# Patient Record
Sex: MALE | Race: WHITE | NOT HISPANIC OR LATINO | URBAN - METROPOLITAN AREA
[De-identification: names, ages, dates, MRNs, and addresses within clinical notes are randomized per-mention and may not be internally consistent; named-entity substitution may affect disease eponyms.]

---

## 2024-06-10 PROBLEM — K21.9 GERD WITHOUT ESOPHAGITIS: Status: ACTIVE | Noted: 2024-06-10

## 2024-06-10 PROBLEM — R10.84 GENERALIZED ABDOMINAL PAIN: Status: ACTIVE | Noted: 2024-06-10

## 2024-06-10 PROBLEM — J02.9 SORE THROAT: Status: ACTIVE | Noted: 2024-06-10

## 2024-06-10 PROBLEM — M54.50 LOW BACK PAIN: Status: ACTIVE | Noted: 2024-06-10

## 2024-06-10 PROBLEM — K59.09 CHRONIC CONSTIPATION: Status: ACTIVE | Noted: 2024-06-10

## 2024-06-10 PROBLEM — S05.01XA: Status: ACTIVE | Noted: 2024-06-10

## 2024-06-11 ENCOUNTER — APPOINTMENT (OUTPATIENT)
Dept: GASTROENTEROLOGY | Facility: CLINIC | Age: 69
End: 2024-06-11
Payer: MEDICARE

## 2024-06-11 ENCOUNTER — APPOINTMENT (OUTPATIENT)
Dept: GASTROENTEROLOGY | Facility: CLINIC | Age: 69
End: 2024-06-11

## 2024-06-11 VITALS
DIASTOLIC BLOOD PRESSURE: 70 MMHG | RESPIRATION RATE: 16 BRPM | SYSTOLIC BLOOD PRESSURE: 116 MMHG | HEART RATE: 69 BPM | OXYGEN SATURATION: 93 % | HEIGHT: 72 IN | BODY MASS INDEX: 26.55 KG/M2 | WEIGHT: 196 LBS

## 2024-06-11 DIAGNOSIS — K21.9 GERD WITHOUT ESOPHAGITIS: ICD-10-CM

## 2024-06-11 DIAGNOSIS — K59.09 CHRONIC CONSTIPATION: ICD-10-CM

## 2024-06-11 DIAGNOSIS — Z86.010 HISTORY OF COLON POLYPS: Primary | ICD-10-CM

## 2024-06-11 PROBLEM — K64.9 HEMORRHOID: Status: ACTIVE | Noted: 2023-06-05

## 2024-06-11 PROBLEM — F32.5 MAJOR DEPRESSION, SINGLE EPISODE, IN COMPLETE REMISSION (CMS-HCC): Status: ACTIVE | Noted: 2023-05-11

## 2024-06-11 PROBLEM — M50.90 CERVICAL DISC DISEASE: Status: ACTIVE | Noted: 2024-06-11

## 2024-06-11 PROBLEM — M47.812 SPONDYLOSIS WITHOUT MYELOPATHY OR RADICULOPATHY, CERVICAL REGION: Status: ACTIVE | Noted: 2023-05-11

## 2024-06-11 PROBLEM — R73.01 IFG (IMPAIRED FASTING GLUCOSE): Status: ACTIVE | Noted: 2023-05-11

## 2024-06-11 PROBLEM — G57.72 COMPLEX REGIONAL PAIN SYNDROME TYPE 2 OF LEFT LOWER EXTREMITY: Status: ACTIVE | Noted: 2023-05-11

## 2024-06-11 PROBLEM — F41.1 GENERALIZED ANXIETY DISORDER: Status: ACTIVE | Noted: 2023-05-11

## 2024-06-11 PROBLEM — C73 THYROID CANCER (MULTI): Status: ACTIVE | Noted: 2023-05-11

## 2024-06-11 PROBLEM — E04.9 GOITER: Status: ACTIVE | Noted: 2024-06-11

## 2024-06-11 PROBLEM — D72.819 LEUKOPENIA: Status: ACTIVE | Noted: 2024-06-03

## 2024-06-11 PROBLEM — N32.81 DETRUSOR OVERACTIVITY: Status: ACTIVE | Noted: 2024-06-11

## 2024-06-11 PROBLEM — F41.9 ANXIETY: Status: ACTIVE | Noted: 2024-06-11

## 2024-06-11 PROBLEM — G90.50 COMPLEX REGIONAL PAIN SYNDROME I: Status: ACTIVE | Noted: 2024-06-11

## 2024-06-11 PROBLEM — E78.2 MIXED HYPERLIPIDEMIA: Status: ACTIVE | Noted: 2023-05-11

## 2024-06-11 PROBLEM — M15.9 GENERALIZED OSTEOARTHRITIS: Status: ACTIVE | Noted: 2024-06-11

## 2024-06-11 PROBLEM — K44.9 HIATAL HERNIA: Status: ACTIVE | Noted: 2023-05-11

## 2024-06-11 PROBLEM — E55.9 VITAMIN D DEFICIENCY: Status: ACTIVE | Noted: 2023-05-11

## 2024-06-11 PROBLEM — N40.0 BENIGN PROSTATIC HYPERPLASIA WITHOUT URINARY OBSTRUCTION: Status: ACTIVE | Noted: 2023-05-11

## 2024-06-11 PROBLEM — H91.90 HOH (HARD OF HEARING): Status: ACTIVE | Noted: 2024-06-11

## 2024-06-11 PROCEDURE — 1159F MED LIST DOCD IN RCRD: CPT | Performed by: NURSE PRACTITIONER

## 2024-06-11 PROCEDURE — 99213 OFFICE O/P EST LOW 20 MIN: CPT | Performed by: NURSE PRACTITIONER

## 2024-06-11 PROCEDURE — 1160F RVW MEDS BY RX/DR IN RCRD: CPT | Performed by: NURSE PRACTITIONER

## 2024-06-11 PROCEDURE — 1036F TOBACCO NON-USER: CPT | Performed by: NURSE PRACTITIONER

## 2024-06-11 RX ORDER — MELATONIN 10 MG/ML
DROPS ORAL
COMMUNITY

## 2024-06-11 RX ORDER — FINASTERIDE 5 MG/1
TABLET, FILM COATED ORAL
COMMUNITY

## 2024-06-11 RX ORDER — BUPROPION HYDROCHLORIDE 150 MG/1
150 TABLET, EXTENDED RELEASE ORAL 2 TIMES DAILY
COMMUNITY

## 2024-06-11 RX ORDER — LEVOTHYROXINE SODIUM 112 UG/1
112 TABLET ORAL
COMMUNITY

## 2024-06-11 RX ORDER — OMEPRAZOLE 40 MG/1
1 CAPSULE, DELAYED RELEASE ORAL
COMMUNITY
Start: 2021-03-19

## 2024-06-11 RX ORDER — DICYCLOMINE HYDROCHLORIDE 20 MG/1
TABLET ORAL
COMMUNITY
Start: 2020-10-16

## 2024-06-11 RX ORDER — TAMSULOSIN HYDROCHLORIDE 0.4 MG/1
CAPSULE ORAL
COMMUNITY

## 2024-06-11 RX ORDER — MIRABEGRON 25 MG/1
TABLET, FILM COATED, EXTENDED RELEASE ORAL
COMMUNITY

## 2024-06-11 RX ORDER — MELOXICAM 15 MG/1
TABLET ORAL
COMMUNITY

## 2024-06-11 RX ORDER — CYCLOBENZAPRINE HCL 5 MG
TABLET ORAL
COMMUNITY
Start: 2023-08-21

## 2024-06-11 RX ORDER — LYSINE HCL 500 MG
2000 TABLET ORAL
COMMUNITY

## 2024-06-11 RX ORDER — FLUOXETINE HYDROCHLORIDE 20 MG/1
20 CAPSULE ORAL
COMMUNITY

## 2024-06-11 RX ORDER — POLYETHYLENE GLYCOL 3350 17 G/17G
POWDER, FOR SOLUTION ORAL
COMMUNITY

## 2024-06-11 RX ORDER — CLONAZEPAM 1 MG/1
TABLET ORAL
COMMUNITY

## 2024-06-11 NOTE — PROGRESS NOTES
Subjective   Patient ID: Luciano Silva is a 69 y.o. male who presents for Constipation (Takes Miralax, fiber, and dicyclomine, states that this helps with constipation. He also states that he is overdue for colonoscopy, last one in 2019.).  \[facility] [place] [facility]\"" 7/19/2023 with Mary Cordova NP:  1. Chronic GERD - EGD - 7/22 - normal esophagus, 1cm HH  2. History of chronic NSAID - takes meloxicam daily   3. Chronic constipation - IBS C - uses Miralax and fiber - colonoscopy - 8/19 - normal, no specimens collected. Repeat in 5 years.      Plan:  1. Continue omeprazole 40 mg once a day -sent - #90, RF#3 to the patient's pharmacy  2. Chronic constipation -use MiraLAX 17 g 1-2 times a day and continue fiber supplements daily. Increase water intake to 40 to 60 ounces of water daily  3. Advised to take dicyclomine 20 mg as needed. Currently taking it 3 times a day which may be too much and may be increasing constipation. # 270, RF#3 to the patient's pharmacy  4. Patient will be due for a surveillance colonoscopy in August 2024  5. Follow-up in the GI clinic in July 2024     ->Colonoscopy 2019:   No specimens collected.  Recommendation:        - Discharge patient to home (ambulatory).                         - Resume previous diet.                         - Continue present medications.                         - Repeat colonoscopy in 5 years for surveillance.    Patient here to schedule surveillance colonoscopy.  He reports history of colon polyps.  Having daily bowel movements with MiraLAX once daily.  He denies constipation or diarrhea.  He uses dicyclomine as needed to help with abdominal discomfort.  No complaints of melena or hematochezia.  Taking omeprazole daily with good response to his GERD symptoms.  Continues to use meloxicam as needed.  No nausea or vomiting.  No dysphagia or odynophagia.  Weight and appetite are stable.  Former smoker.  No alcohol use.    Review of Systems   All other systems reviewed and are  negative.      Objective   Physical Exam  Vitals reviewed.   Constitutional:       General: He is awake. He is not in acute distress.     Appearance: Normal appearance. He is well-developed and normal weight. He is not ill-appearing, toxic-appearing or diaphoretic.   HENT:      Head: Normocephalic and atraumatic.   Eyes:      Pupils: Pupils are equal, round, and reactive to light.   Cardiovascular:      Rate and Rhythm: Normal rate and regular rhythm.      Heart sounds: Normal heart sounds. No murmur heard.  Pulmonary:      Effort: Pulmonary effort is normal.   Abdominal:      General: Bowel sounds are normal.      Palpations: Abdomen is soft. There is no hepatomegaly.      Tenderness: There is no abdominal tenderness.   Musculoskeletal:         General: Normal range of motion.      Cervical back: Normal range of motion.      Right lower leg: No edema.      Left lower leg: No edema.   Skin:     General: Skin is warm and dry.      Coloration: Skin is not jaundiced or pale.   Neurological:      General: No focal deficit present.      Mental Status: He is alert and oriented to person, place, and time.      Motor: No weakness.      Gait: Gait normal.   Psychiatric:         Mood and Affect: Mood normal.         Behavior: Behavior normal. Behavior is cooperative.         Thought Content: Thought content normal.         Judgment: Judgment normal.         Assessment/Plan   Diagnoses and all orders for this visit:  History of colon polyps  -     Colonoscopy Screening; High Risk Patient; history of colon polyps; the procedure was discussed at length, including all possible risks.  Importance of the bowel prep was stressed to the patient for optimal results.  Chronic constipation  Good response to MiraLAX.  Recommend he continue MiraLAX and titrate as needed.  -Adequate fluids throughout the day  -Walk 30 minutes daily to help promote GI motility  -Footstool or squatty potty during bowel movements  -Increase dietary  fiber  -Limit dicyclomine use, side effects reviewed.  GERD without esophagitis  -Antireflux lifestyle recommended  -Daily PPI 15 to 30 minutes before breakfast.  -Follow-up in 1 year, sooner if needed.         MARIAN Deleon-CNP 06/11/24 2:12 PM

## 2024-06-26 ENCOUNTER — ANESTHESIA EVENT (OUTPATIENT)
Dept: GASTROENTEROLOGY | Facility: EXTERNAL LOCATION | Age: 69
End: 2024-06-26

## 2024-06-26 DIAGNOSIS — K58.1 IRRITABLE BOWEL SYNDROME WITH CONSTIPATION: Primary | ICD-10-CM

## 2024-06-26 RX ORDER — DICYCLOMINE HYDROCHLORIDE 20 MG/1
10 TABLET ORAL 4 TIMES DAILY PRN
Qty: 60 TABLET | Refills: 0 | Status: SHIPPED | OUTPATIENT
Start: 2024-06-26 | End: 2024-07-26

## 2024-07-11 ENCOUNTER — APPOINTMENT (OUTPATIENT)
Dept: GASTROENTEROLOGY | Facility: EXTERNAL LOCATION | Age: 69
End: 2024-07-11
Payer: COMMERCIAL

## 2024-07-11 ENCOUNTER — ANESTHESIA (OUTPATIENT)
Dept: GASTROENTEROLOGY | Facility: EXTERNAL LOCATION | Age: 69
End: 2024-07-11

## 2024-07-11 VITALS
HEIGHT: 72 IN | TEMPERATURE: 97.9 F | WEIGHT: 195 LBS | HEART RATE: 65 BPM | BODY MASS INDEX: 26.41 KG/M2 | DIASTOLIC BLOOD PRESSURE: 74 MMHG | RESPIRATION RATE: 10 BRPM | SYSTOLIC BLOOD PRESSURE: 125 MMHG | OXYGEN SATURATION: 99 %

## 2024-07-11 DIAGNOSIS — Z86.010 HISTORY OF COLON POLYPS: Primary | ICD-10-CM

## 2024-07-11 PROCEDURE — G0105 COLORECTAL SCRN; HI RISK IND: HCPCS | Performed by: INTERNAL MEDICINE

## 2024-07-11 RX ORDER — SODIUM CHLORIDE 9 MG/ML
20 INJECTION, SOLUTION INTRAVENOUS CONTINUOUS
Status: CANCELLED | OUTPATIENT
Start: 2024-07-11

## 2024-07-11 RX ORDER — SODIUM CHLORIDE 9 MG/ML
INJECTION, SOLUTION INTRAVENOUS CONTINUOUS PRN
Status: DISCONTINUED | OUTPATIENT
Start: 2024-07-11 | End: 2024-07-11

## 2024-07-11 RX ORDER — LIDOCAINE HYDROCHLORIDE 20 MG/ML
INJECTION, SOLUTION INFILTRATION; PERINEURAL AS NEEDED
Status: DISCONTINUED | OUTPATIENT
Start: 2024-07-11 | End: 2024-07-11

## 2024-07-11 RX ORDER — PROPOFOL 10 MG/ML
INJECTION, EMULSION INTRAVENOUS AS NEEDED
Status: DISCONTINUED | OUTPATIENT
Start: 2024-07-11 | End: 2024-07-11

## 2024-07-11 SDOH — HEALTH STABILITY: MENTAL HEALTH: CURRENT SMOKER: 0

## 2024-07-11 ASSESSMENT — PAIN SCALES - GENERAL
PAINLEVEL_OUTOF10: 0 - NO PAIN
PAIN_LEVEL: 0
PAINLEVEL_OUTOF10: 0 - NO PAIN

## 2024-07-11 ASSESSMENT — COLUMBIA-SUICIDE SEVERITY RATING SCALE - C-SSRS
2. HAVE YOU ACTUALLY HAD ANY THOUGHTS OF KILLING YOURSELF?: NO
1. IN THE PAST MONTH, HAVE YOU WISHED YOU WERE DEAD OR WISHED YOU COULD GO TO SLEEP AND NOT WAKE UP?: NO
6. HAVE YOU EVER DONE ANYTHING, STARTED TO DO ANYTHING, OR PREPARED TO DO ANYTHING TO END YOUR LIFE?: NO

## 2024-07-11 ASSESSMENT — PAIN - FUNCTIONAL ASSESSMENT
PAIN_FUNCTIONAL_ASSESSMENT: 0-10

## 2024-07-11 NOTE — ANESTHESIA POSTPROCEDURE EVALUATION
Patient: Luciano Silva    Procedure Summary       Date: 07/11/24 Room / Location: [facility]    Anesthesia Start: 1328 Anesthesia Stop: 1352    Procedure: COLONOSCOPY Diagnosis: History of colon polyps    Scheduled Providers: Richard Fernandez MD Responsible Provider: CECILE Bustamante    Anesthesia Type: MAC ASA Status: 2            Anesthesia Type: MAC    Vitals Value Taken Time   /88 07/11/24 1359   Temp 36.6 07/11/24 1359   Pulse 66 07/11/24 1359   Resp 12` 07/11/24 1359   SpO2 97 07/11/24 1359       Anesthesia Post Evaluation    Patient location during evaluation: bedside  Patient participation: complete - patient participated  Level of consciousness: awake  Pain score: 0  Pain management: adequate  Airway patency: patent  Cardiovascular status: acceptable  Respiratory status: acceptable  Hydration status: acceptable  Postoperative Nausea and Vomiting: none      No notable events documented.

## 2024-07-11 NOTE — ANESTHESIA PREPROCEDURE EVALUATION
Patient: Luciano Silva    Procedure Information       Date/Time: 07/11/24 1230    Scheduled providers: Richard Fernandez MD    Procedure: COLONOSCOPY    Location: [facility]            Relevant Problems   Cardiac   (+) Mixed hyperlipidemia      Neuro   (+) Anxiety   (+) Complex regional pain syndrome of lower extremity   (+) Complex regional pain syndrome type 2 of left lower extremity   (+) Dysthymia   (+) Generalized anxiety disorder   (+) Major depression, single episode, in complete remission (CMS-HCC)      GI   (+) GERD without esophagitis   (+) Gastroesophageal reflux disease with esophagitis   (+) Hiatal hernia      /Renal   (+) Benign prostatic hyperplasia without urinary obstruction      Endocrine   (+) Goiter   (+) Thyroid cancer (Multi)      Musculoskeletal   (+) Complex regional pain syndrome I   (+) Complex regional pain syndrome of lower extremity   (+) Complex regional pain syndrome type 2 of left lower extremity   (+) Generalized osteoarthritis   (+) Osteoarthritis   (+) Spondylosis without myelopathy or radiculopathy, cervical region      HEENT   (+) [place] (hard of hearing)       Clinical information reviewed:   Tobacco  Allergies  Meds   Med Hx  Surg Hx   Fam Hx  Soc Hx        NPO Detail:  NPO/Void Status  Carbohydrate Drink Given Prior to Surgery? : N  Date of Last Liquid: 07/11/24  Time of Last Liquid: 0830  Date of Last Solid: 07/09/24  Time of Last Solid: 1800  Last Intake Type: Clear fluids  Time of Last Void: 1241         Physical Exam    Airway  Mallampati: II  TM distance: >3 FB  Neck ROM: full     Cardiovascular - normal exam  Rhythm: regular  Rate: normal     Dental - normal exam  (+) upper dentures, lower dentures     Pulmonary - normal exam  Breath sounds clear to auscultation     Abdominal - normal exam         Anesthesia Plan    History of general anesthesia?: yes  History of complications of general anesthesia?: no    ASA 2     MAC     The patient is not a  current smoker.    intravenous induction   Anesthetic plan and risks discussed with patient.    Plan discussed with CRNA.   Thalidomide Counseling: I discussed with the patient the risks of thalidomide including but not limited to birth defects, anxiety, weakness, chest pain, dizziness, cough and severe allergy.

## 2024-07-11 NOTE — H&P
Outpatient NR Procedure H&P    Patient Profile  Name Luciano Silva  Date of Birth 1955  MRN [id number]  PCP [facility]        Diagnosis: screening colonoscopy  Procedure(s): Colonoscopy       Allergies  Allergies   Allergen Reactions    Fesoterodine Itching and Other     mentally confused     Other Reaction(s): Other: See Comments      mentally confused    mentally confused    Gabapentin Confusion     Other Reaction(s): Confusion    Maprotiline Confusion     Other Reaction(s): Confusion    Tramadol Hives and Unknown    Amoxicillin-Pot Clavulanate Unknown, Rash and Other     Other Reaction(s): Upset Stomach      Other Reaction(s): Intolerance       Past Medical History   Past Medical History:   Diagnosis Date    Anxiety     Colon polyp     Depression     GERD (gastroesophageal reflux disease)     History of BPH     Personal history of other endocrine, nutritional and metabolic disease     History of hypothyroidism       Medication Reviewed - yes  Prior to Admission medications    Medication Sig Start Date End Date Taking? Authorizing Provider   buPROPion SR (Wellbutrin SR) 150 mg 12 hr tablet Take 1 tablet (150 mg) by mouth 2 times a day.   Yes Historical Provider, MD   calcium carbonate-vit D3-min 600 mg calcium- 400 unit tablet Take 2,000 Units by mouth.   Yes Historical Provider, MD   cholecalciferol, vitamin D3, 50 mcg (2,000 unit) tablet,chewable Chew.   Yes Historical Provider, MD   clonazePAM (KlonoPIN) 1 mg tablet TAKE 1 TABLET (1 MG) BY MOUTH DAILY AT BEDTIME AS NEEDED FOR ANXIETY   Yes Historical Provider, MD   cyclobenzaprine (Flexeril) 5 mg tablet TAKE 1/2 TO 1 (ONE-HALF TO ONE) TABLET BY MOUTH EVERY 8 HOURS AS NEEDED FOR MUSCLE TIGHTNESS 8/21/23  Yes Historical Provider, MD   dicyclomine (Bentyl) 20 mg tablet Take 0.5 tablets (10 mg) by mouth 4 times a day as needed (abdominal pain). 6/26/24 7/26/24 Yes MARIAN Deleon-CNP   finasteride (Proscar) 5 mg tablet 1 tablet Orally  Once a day   Yes Historical Provider, MD   FLUoxetine (PROzac) 20 mg capsule Take 1 capsule (20 mg) by mouth once daily.   Yes Historical Provider, MD   levothyroxine (Synthroid, Levoxyl) 112 mcg tablet Take 1 tablet (112 mcg) by mouth once daily in the morning. Take before meals. Take on an empty stomach.   Yes Historical Provider, MD   meloxicam (Mobic) 15 mg tablet TAKE 1/2 TO 1 TABLET EVERY DAY AS NEEDED WITH FOOD AND WATER   Yes Historical Provider, MD   mirabegron (Mybetriq) 25 mg tablet extended release 24 hr 24 hr tablet 1 tablet Orally Once a day   Yes Historical Provider, MD   omeprazole (PriLOSEC) 40 mg DR capsule 1 capsule (40 mg). 3/19/21  Yes Historical Provider, MD   polyethylene glycol (Miralax) 17 gram/dose powder Take by mouth.   Yes Historical Provider, MD   tamsulosin (Flomax) 0.4 mg 24 hr capsule 1 capsule Orally Once a day   Yes Historical Provider, MD       Physical Exam  Vitals:    07/11/24 1237   BP: 144/83   Pulse: 62   Resp: 11   Temp: 36.1 °C (97 °F)   SpO2: 98%      Weight   Vitals:    07/11/24 1237   Weight: 88.5 kg (195 lb)     BMI Body mass index is 26.45 kg/m².    General: A&Ox3, NAD.  HEENT: AT/NC.   CV: RRR. No murmur.  Resp: CTA bilaterally. No wheezing, rhonchi or rales.   GI: Soft, NT/ND. BSx4.  Extrem: No edema. Pulses intact.  Skin: No Jaundice.   Neuro: No focal deficits.   Psych: Normal mood and affect.        Oropharyngeal Classification I (soft palate, uvula, fauces, and tonsillar pillars visible)  ASA PS Classification 2  Sedation Plan: Deep Sedation.  Procedure Plan - pre-procedural (re)assesment completed by physician:  discharge/transfer patient when discharge criteria met    Richard Fernandez MD  7/11/2024 1:30 PM

## 2024-07-11 NOTE — DISCHARGE INSTRUCTIONS
Patient Instructions Post Procedure      The anesthetics, sedatives or narcotics which were given to you today will be acting in your body for the next 24 hours, so you might feel a little sleepy or groggy.  This feeling should slowly wear off. Carefully read and follow the instructions.     You received sedation today:  - Do not drive or operate any machinery or power tools of any kind.   - No alcoholic beverages today, not even beer or wine.  - Do not make any important decisions or sign any legal documents.  - No over the counter medications that contain alcohol or that may cause drowsiness.    While it is common to experience mild to moderate abdominal distention, gas, or belching after your procedure, if any of these symptoms occur following discharge from the GI Lab or within one week of having your procedure, call the [facility] [place] to be advised whether a visit to your nearest Urgent Care or Emergency Department is indicated.  Take this paper with you if you go.   - If you develop an allergic reaction to the medications that were given during your procedure such as difficulty breathing, rash, hives, severe nausea, vomiting or lightheadedness.  - If you experience chest pain, shortness of breath, severe abdominal pain, fevers and chills.  -If you develop signs and symptoms of bleeding such as blood in your spit, if your stools turn black, tarry, or bloody  - If you have not urinated within 8 hours following your procedure.  - If your IV site becomes painful, red, inflamed, or looks infected.    If you received a biopsy/polypectomy/sphincterotomy the following instructions apply below:  __ Do not use Aspirin containing products, non-steroidal medications or anti-coagulants for one week following your procedure. (Examples of these types of medications are: Advil, Arthrotec, Aleve, Coumadin, Ecotrin, Heparin, Ibuprofen, Indocin, Motrin, Naprosyn, Nuprin, Plavix, Vioxx, and Voltarin, or their generic  forms.  This list is not all-inclusive.  Check with your physician or pharmacist before resuming medications.)   __ Eat a soft diet today.  Avoid foods that are poorly digested for the next 24 hours.  These foods would include: nuts, beans, lettuce, red meats, and fried foods. Start with liquids and advance your diet as tolerated, gradually work up to eating solids.   __ Do not have a Barium Study or Enema for one week.    Your physician recommends the additional following instructions:    -You have a contact number available for emergencies. The signs and symptoms of potential delayed complications were discussed with you. You may return to normal activities tomorrow.  -Resume your previous diet or other if specified.  -Continue your present medications.   -We are waiting for your pathology results, if applicable.  -The findings and recommendations have been discussed with you and/or family.  - Please see Medication Reconciliation Form for new medication/medications prescribed.     If you experience any problems or have any questions following discharge from the GI Lab, please call: [phone] from 7 am- 4:30 pm.  In the event of an emergency please go to the closest Emergency Department or call Dr. Fernandez [phone]

## 2024-08-02 DIAGNOSIS — K21.9 GERD WITHOUT ESOPHAGITIS: ICD-10-CM

## 2024-08-05 ENCOUNTER — TELEPHONE (OUTPATIENT)
Dept: GASTROENTEROLOGY | Facility: CLINIC | Age: 69
End: 2024-08-05
Payer: COMMERCIAL

## 2024-08-05 DIAGNOSIS — K58.1 IRRITABLE BOWEL SYNDROME WITH CONSTIPATION: Primary | ICD-10-CM

## 2024-08-05 RX ORDER — DICYCLOMINE HYDROCHLORIDE 10 MG/1
10 CAPSULE ORAL 3 TIMES DAILY PRN
Qty: 120 CAPSULE | Refills: 3 | Status: SHIPPED | OUTPATIENT
Start: 2024-08-05 | End: 2024-09-04

## 2024-08-05 RX ORDER — OMEPRAZOLE 40 MG/1
40 CAPSULE, DELAYED RELEASE ORAL
Qty: 90 CAPSULE | Refills: 3 | Status: SHIPPED | OUTPATIENT
Start: 2024-08-05

## 2024-08-05 NOTE — TELEPHONE ENCOUNTER
Patient asked for refill of dicyclomine. I don't see an open order in his chart for this med. He's also asking if he can go back to taking it tid?

## 2024-11-23 DIAGNOSIS — K58.1 IRRITABLE BOWEL SYNDROME WITH CONSTIPATION: ICD-10-CM

## 2024-11-26 RX ORDER — DICYCLOMINE HYDROCHLORIDE 10 MG/1
CAPSULE ORAL
Qty: 120 CAPSULE | Refills: 0 | Status: SHIPPED | OUTPATIENT
Start: 2024-11-26

## 2024-12-26 DIAGNOSIS — K58.1 IRRITABLE BOWEL SYNDROME WITH CONSTIPATION: ICD-10-CM

## 2024-12-26 RX ORDER — DICYCLOMINE HYDROCHLORIDE 10 MG/1
10 CAPSULE ORAL 4 TIMES DAILY PRN
Qty: 360 CAPSULE | Refills: 0 | Status: SHIPPED | OUTPATIENT
Start: 2024-12-26 | End: 2025-03-26

## 2025-03-22 DIAGNOSIS — K58.1 IRRITABLE BOWEL SYNDROME WITH CONSTIPATION: ICD-10-CM

## 2025-03-24 RX ORDER — DICYCLOMINE HYDROCHLORIDE 10 MG/1
CAPSULE ORAL
Qty: 360 CAPSULE | Refills: 0 | Status: SHIPPED | OUTPATIENT
Start: 2025-03-24

## 2025-06-09 ENCOUNTER — APPOINTMENT (OUTPATIENT)
Dept: GASTROENTEROLOGY | Facility: CLINIC | Age: 70
End: 2025-06-09
Payer: COMMERCIAL

## 2025-06-09 VITALS
WEIGHT: 202 LBS | RESPIRATION RATE: 16 BRPM | HEIGHT: 72 IN | HEART RATE: 66 BPM | DIASTOLIC BLOOD PRESSURE: 79 MMHG | OXYGEN SATURATION: 94 % | SYSTOLIC BLOOD PRESSURE: 123 MMHG | BODY MASS INDEX: 27.36 KG/M2

## 2025-06-09 DIAGNOSIS — K58.1 IRRITABLE BOWEL SYNDROME WITH CONSTIPATION: ICD-10-CM

## 2025-06-09 DIAGNOSIS — K21.9 GERD WITHOUT ESOPHAGITIS: ICD-10-CM

## 2025-06-09 PROBLEM — R13.10 DYSPHAGIA: Status: ACTIVE | Noted: 2025-06-09

## 2025-06-09 PROCEDURE — 99213 OFFICE O/P EST LOW 20 MIN: CPT | Performed by: NURSE PRACTITIONER

## 2025-06-09 PROCEDURE — 1159F MED LIST DOCD IN RCRD: CPT | Performed by: NURSE PRACTITIONER

## 2025-06-09 PROCEDURE — 1036F TOBACCO NON-USER: CPT | Performed by: NURSE PRACTITIONER

## 2025-06-09 PROCEDURE — 3008F BODY MASS INDEX DOCD: CPT | Performed by: NURSE PRACTITIONER

## 2025-06-09 RX ORDER — DICYCLOMINE HYDROCHLORIDE 10 MG/1
10 CAPSULE ORAL 4 TIMES DAILY PRN
Qty: 120 CAPSULE | Refills: 3 | Status: SHIPPED | OUTPATIENT
Start: 2025-06-09 | End: 2025-07-09

## 2025-06-09 RX ORDER — OMEPRAZOLE 40 MG/1
40 CAPSULE, DELAYED RELEASE ORAL
Qty: 90 CAPSULE | Refills: 3 | Status: SHIPPED | OUTPATIENT
Start: 2025-06-09 | End: 2026-06-09

## 2025-06-09 NOTE — PROGRESS NOTES
Subjective   Patient ID: Luciano Silva is a 70 y.o. male who presents for Follow-up (Following up after a year for constipation. Using Miralax and fiber and this helps. No complaints.).  HPI  LOV 6/11/24:  History of colon polyps  -     Colonoscopy Screening; High Risk Patient; history of colon polyps; the procedure was discussed at length, including all possible risks.  Importance of the bowel prep was stressed to the patient for optimal results.  Chronic constipation  Good response to MiraLAX.  Recommend he continue MiraLAX and titrate as needed.  -Adequate fluids throughout the day  -Walk 30 minutes daily to help promote GI motility  -Footstool or squatty potty during bowel movements  -Increase dietary fiber  -Limit dicyclomine use, side effects reviewed.  GERD without esophagitis  -Antireflux lifestyle recommended  -Daily PPI 15 to 30 minutes before breakfast.  -Follow-up in 1 year, sooner if needed.   ->Colonoscopy 7/2024:  Normal    [facility] follow-up 6/9/2025:  Patient adhering to a daily bowel regimen with good response.  He is having a daily soft formed BM.  No overt GI bleeding.  On occasion he will use dicyclomine for abdominal cramping with good response.  Taking daily PPI with good response.  Has no GI complaints at this time.  Review of Systems   All other systems reviewed and are negative.      Objective   Physical Exam  Vitals reviewed.   Constitutional:       General: He is awake. He is not in acute distress.     Appearance: Normal appearance. He is well-developed. He is not ill-appearing, toxic-appearing or diaphoretic.   HENT:      Head: Normocephalic and atraumatic.   Eyes:      General: No scleral icterus.     Conjunctiva/sclera: Conjunctivae normal.   Pulmonary:      Effort: Pulmonary effort is normal. No respiratory distress.   Abdominal:      General: Bowel sounds are normal.      Palpations: Abdomen is soft.      Tenderness: There is no abdominal tenderness.   Skin:     Coloration: Skin is not  cyanotic, jaundiced or pale.   Neurological:      General: No focal deficit present.      Mental Status: He is alert and oriented to person, place, and time.   Psychiatric:         Attention and Perception: Attention and perception normal.         Mood and Affect: Mood normal.         Behavior: Behavior normal. Behavior is cooperative.         Assessment/Plan   Diagnoses and all orders for this visit:  Irritable bowel syndrome with constipation  70-year-old male with a normal colonoscopy.  Clinical history suggestive for IBS with constipation.  He should continue a daily bowel regimen with MiraLAX and fiber.  He is using dicyclomine, but sparingly.  All possible side effects reviewed with the patient.  -     dicyclomine (Bentyl) 10 mg capsule; Take 1 capsule (10 mg) by mouth 4 times a day as needed (abdominal cramping).  GERD without esophagitis  Antireflux lifestyle.  No red flag symptoms.  Follow-up in 1 year, sooner if needed.  -     omeprazole (PriLOSEC) 40 mg DR capsule; Take 1 capsule (40 mg) by mouth once daily in the morning. Take before meals. 30 MINUTES BEFORE BREAKFAST           KIMBERLEE Deleon 06/09/25 1:14 PM